# Patient Record
Sex: MALE | Race: WHITE | NOT HISPANIC OR LATINO
[De-identification: names, ages, dates, MRNs, and addresses within clinical notes are randomized per-mention and may not be internally consistent; named-entity substitution may affect disease eponyms.]

---

## 2018-12-05 ENCOUNTER — RESULT REVIEW (OUTPATIENT)
Age: 83
End: 2018-12-05

## 2018-12-05 PROBLEM — Z00.00 ENCOUNTER FOR PREVENTIVE HEALTH EXAMINATION: Status: ACTIVE | Noted: 2018-12-05

## 2018-12-06 ENCOUNTER — APPOINTMENT (OUTPATIENT)
Dept: GASTROENTEROLOGY | Facility: HOSPITAL | Age: 83
End: 2018-12-06

## 2019-02-05 ENCOUNTER — INBOUND DOCUMENT (OUTPATIENT)
Age: 84
End: 2019-02-05

## 2019-03-27 ENCOUNTER — APPOINTMENT (OUTPATIENT)
Dept: UROLOGY | Facility: CLINIC | Age: 84
End: 2019-03-27
Payer: MEDICARE

## 2019-03-27 VITALS — DIASTOLIC BLOOD PRESSURE: 62 MMHG | OXYGEN SATURATION: 96 % | SYSTOLIC BLOOD PRESSURE: 100 MMHG | HEART RATE: 86 BPM

## 2019-03-27 DIAGNOSIS — Z87.440 PERSONAL HISTORY OF URINARY (TRACT) INFECTIONS: ICD-10-CM

## 2019-03-27 PROCEDURE — 99213 OFFICE O/P EST LOW 20 MIN: CPT | Mod: 25

## 2019-03-27 PROCEDURE — 51710 CHANGE OF BLADDER TUBE: CPT

## 2019-03-27 RX ORDER — SULFAMETHOXAZOLE AND TRIMETHOPRIM 800; 160 MG/1; MG/1
800-160 TABLET ORAL DAILY
Qty: 30 | Refills: 0 | Status: ACTIVE | COMMUNITY
Start: 2019-03-27 | End: 1900-01-01

## 2019-03-27 NOTE — HISTORY OF PRESENT ILLNESS
[FreeTextEntry1] : 89 y/o male \par admitted 1/19 \par hx of uti\par hx of urinary retention, poor emptying, elevated cr\par UX - enteroccus, on iv abx \par cool placed\par cr improving \par s/p IR SPT placement \par Cr down 2.0-2.2\par \par renal sono 1/19 - b/l medical renal disease \par \par \par hx of BPH\par hx of TURP many years ago\par pt parkinsons, \par htn\par \par currently doing well\par out of rehab\par SP tube still in place 14fr\par no issues\par denies hematuria\par with wife and aide\par poor memory

## 2019-03-27 NOTE — ASSESSMENT
[FreeTextEntry1] : Patient seen and examined\par multiple medical issues \par SP Tube changed today to 14 fr cool \par irrigated easily \par d/w wife and home aid\par can clamp and trial of void q 4-6 hr\par if large residual can leave to gravity \par if unable to void - can leave to gravity \par bactrim as needed if uti signs \par will return in 4-6 weeks for change \par \par  -

## 2019-03-27 NOTE — PHYSICAL EXAM
[General Appearance - Well Nourished] : well nourished [Abdomen Soft] : soft [Abdomen Tenderness] : non-tender [Costovertebral Angle Tenderness] : no ~M costovertebral angle tenderness [FreeTextEntry1] : SPT in place , normal genitalia  [] : no rash [Edema] : no peripheral edema

## 2019-04-24 ENCOUNTER — APPOINTMENT (OUTPATIENT)
Dept: UROLOGY | Facility: CLINIC | Age: 84
End: 2019-04-24
Payer: MEDICARE

## 2019-04-24 PROCEDURE — 51705 CHANGE OF BLADDER TUBE: CPT

## 2019-04-24 PROCEDURE — 99213 OFFICE O/P EST LOW 20 MIN: CPT | Mod: 25

## 2019-04-29 NOTE — ASSESSMENT
[FreeTextEntry1] : Urinary retention\par Patient seen and examined\par multiple medical issues \par SP Tube changed today to 14 fr cool \par irrigated easily \par d/w wife and home aid\par can clamp and trial of void q 4-6 hr\par if large residual can leave to gravity \par if unable to void - can leave to gravity \par bactrim as needed if uti signs \par will return in 4-6 weeks for change

## 2019-04-29 NOTE — HISTORY OF PRESENT ILLNESS
[FreeTextEntry1] : 89 y/o male \par admitted 1/19 \par hx of uti\par hx of urinary retention, poor emptying, elevated cr\par UX - enteroccus, on iv abx \par cool placed\par cr improving \par s/p IR SPT placement \par Cr down 2.0-2.2\par \par renal sono 1/19 - b/l medical renal disease \par \par \par hx of BPH\par hx of TURP many years ago\par pt parkinsons, \par htn\par \par currently doing well\par out of rehab\par SP tube still in place 14fr\par no issues\par \par denies hematuria\par with wife and aide\par poor memory \par \par +++++++++++++\par no issues since last visit \par here for SP tube exchange\par

## 2020-12-21 PROBLEM — Z87.440 HISTORY OF URINARY TRACT INFECTION: Status: RESOLVED | Noted: 2019-03-27 | Resolved: 2020-12-21
